# Patient Record
Sex: MALE | Race: WHITE | NOT HISPANIC OR LATINO | URBAN - METROPOLITAN AREA
[De-identification: names, ages, dates, MRNs, and addresses within clinical notes are randomized per-mention and may not be internally consistent; named-entity substitution may affect disease eponyms.]

---

## 2017-05-09 NOTE — PATIENT DISCUSSION
Continue: PreserVision AREDS 2 (vit c,h-ip-reanm-lutein-zeaxan): capsule: 596-457-60-1 mg-unit-mg-mg 1 capsule once a day by mouth

## 2017-05-09 NOTE — PATIENT DISCUSSION
AMD (DRY), OU_:  PRESCRIBE AREDS 2 VITAMINS / AMSLER GRID QD/ UV PROTECTION. SMOKING CESSATION EMPHASIZED. RETURN FOR FOLLOW-UP AS SCHEDULED.

## 2017-11-14 NOTE — PATIENT DISCUSSION
AMD (DRY), ou__:  PRESCRIBE AREDS 2 VITAMINS / AMSLER GRID QD/ UV PROTECTION. SMOKING CESSATION EMPHASIZED. RETURN FOR FOLLOW-UP AS SCHEDULED.

## 2017-11-14 NOTE — PATIENT DISCUSSION
Continue: PreserVision AREDS 2 (vit c,k-cg-akate-lutein-zeaxan): capsule: 670-064-30-4 mg-unit-mg-mg 1 capsule once a day by mouth

## 2018-05-29 NOTE — PATIENT DISCUSSION
Continue: PreserVision AREDS 2 (vit c,b-jz-gjatp-lutein-zeaxan): capsule: 140-243-22-8 mg-unit-mg-mg 1 capsule once a day by mouth

## 2019-04-23 NOTE — PATIENT DISCUSSION
Continue: PreserVision AREDS 2 (vit c,j-qh-jpqsg-lutein-zeaxan): capsule: 633-503-80-6 mg-unit-mg-mg 1 capsule once a day by mouth

## 2019-11-05 NOTE — PATIENT DISCUSSION
New Prescription: neomycin-polymyxin-dexameth (neomycin-polymyxin-dexameth): ointment: 3.5-10,000-0.1 mg-unit/g-% a small amount as directed into both eyes 11-

## 2019-11-05 NOTE — PATIENT DISCUSSION
Continue: PreserVision AREDS 2 (vit c,y-gi-hkqpf-lutein-zeaxan): capsule: 388-320-83-5 mg-unit-mg-mg 1 capsule once a day by mouth

## 2020-05-04 NOTE — PATIENT DISCUSSION
Continue: PreserVision AREDS 2 (vit c,q-ac-rzhwk-lutein-zeaxan): capsule: 474-043-39-2 mg-unit-mg-mg 1 capsule once a day by mouth

## 2020-11-02 NOTE — PATIENT DISCUSSION
Entropion Counseling: The nature of the diagnosis and associated symptoms were discussed. These may include dryness, irritation, tearing, redness. As long as the cornea remains healthy, surgical repair is elective. Alternatives to surgery include aggressive ocular surface lubrication or taping of the lid in an everted position. Oculoplastic consultation is recommended. Return for follow-up as scheduled or sooner if symptoms worsen.

## 2020-11-02 NOTE — PATIENT DISCUSSION
ENTROPION,LLL__:  NOT VISUALLY SIGNIFICANT TO THE PATIENT. RECOMMEND ARTIFICIAL TEARS OR LUBRICATING OINTMENT PRN. REFER TO OCULOPLASTIC SPECIALIST, DR. Brooke Rondon IF PATIEN DESIRES___.

## 2020-11-02 NOTE — PATIENT DISCUSSION
Continue: PreserVision AREDS 2 (vit c,s-ao-ueffm-lutein-zeaxan): capsule: 611-831-54-5 mg-unit-mg-mg 1 capsule once a day by mouth

## 2021-10-12 ENCOUNTER — NEW PATIENT (OUTPATIENT)
Dept: URBAN - METROPOLITAN AREA CLINIC 14 | Facility: CLINIC | Age: 71
End: 2021-10-12

## 2021-10-12 DIAGNOSIS — H04.321: ICD-10-CM

## 2021-10-12 DIAGNOSIS — H04.221: ICD-10-CM

## 2021-10-12 PROCEDURE — 99204 OFFICE O/P NEW MOD 45 MIN: CPT

## 2021-11-08 NOTE — PATIENT DISCUSSION
Age Related Macular Degeneration is a deterioration or breakdown of the eye's macula, the part of the retina that is responsible for central vision. Symptoms include, but are not limited to, hazy vision, difficulty seeing when going from bright light to low light, and a blank or blurry spot in your central vision. I have explained to the patient that successful management is dependent on patient compliance with treatment as prescribed and/or regular follow-up to monitor for possible progression.

## 2022-02-10 ENCOUNTER — POST-OP (OUTPATIENT)
Dept: URBAN - NONMETROPOLITAN AREA CLINIC 5 | Facility: CLINIC | Age: 72
End: 2022-02-10

## 2022-02-10 DIAGNOSIS — H04.321: ICD-10-CM

## 2022-02-10 DIAGNOSIS — Z98.890: ICD-10-CM

## 2022-02-10 DIAGNOSIS — H04.221: ICD-10-CM

## 2022-02-10 PROCEDURE — 99024 POSTOP FOLLOW-UP VISIT: CPT

## 2022-02-10 ASSESSMENT — VISUAL ACUITY
OS_SC: 20/25
OD_SC: 20/25

## 2022-06-07 ENCOUNTER — ESTABLISHED PATIENT (OUTPATIENT)
Dept: URBAN - NONMETROPOLITAN AREA CLINIC 5 | Facility: CLINIC | Age: 72
End: 2022-06-07

## 2022-06-07 DIAGNOSIS — H02.423: ICD-10-CM

## 2022-06-07 DIAGNOSIS — H02.834: ICD-10-CM

## 2022-06-07 DIAGNOSIS — H02.831: ICD-10-CM

## 2022-06-07 PROCEDURE — 92082 INTERMEDIATE VISUAL FIELD XM: CPT

## 2022-06-07 PROCEDURE — 99214 OFFICE O/P EST MOD 30 MIN: CPT

## 2022-06-07 PROCEDURE — 92285 EXTERNAL OCULAR PHOTOGRAPHY: CPT

## 2022-06-08 ASSESSMENT — VISUAL ACUITY
OS_SC: 20/25
OD_SC: 20/25

## 2022-11-08 ENCOUNTER — POST-OP (OUTPATIENT)
Dept: URBAN - METROPOLITAN AREA CLINIC 14 | Facility: CLINIC | Age: 72
End: 2022-11-08

## 2022-11-08 DIAGNOSIS — H02.834: ICD-10-CM

## 2022-11-08 DIAGNOSIS — Z98.890: ICD-10-CM

## 2022-11-08 DIAGNOSIS — H02.423: ICD-10-CM

## 2022-11-08 DIAGNOSIS — H02.831: ICD-10-CM

## 2022-11-08 PROCEDURE — 99024 POSTOP FOLLOW-UP VISIT: CPT

## 2022-11-08 ASSESSMENT — VISUAL ACUITY
OS_SC: 20/25
OD_SC: 20/25

## 2024-12-17 ENCOUNTER — CONSULTATION/EVALUATION (OUTPATIENT)
Age: 74
End: 2024-12-17

## 2024-12-17 DIAGNOSIS — H02.423: ICD-10-CM

## 2024-12-17 PROCEDURE — 92285 EXTERNAL OCULAR PHOTOGRAPHY: CPT

## 2024-12-17 PROCEDURE — 99214 OFFICE O/P EST MOD 30 MIN: CPT

## 2025-06-09 ENCOUNTER — SURGERY/PROCEDURE (OUTPATIENT)
Age: 75
End: 2025-06-09

## 2025-06-09 DIAGNOSIS — H02.423: ICD-10-CM

## 2025-06-09 PROCEDURE — 6790450 RPR BLPOS LEVATOR RESCJ/ADVMNT XTRNL, BILATERAL: Mod: 50

## 2025-06-17 ENCOUNTER — POST-OP (OUTPATIENT)
Age: 75
End: 2025-06-17

## 2025-06-17 DIAGNOSIS — H02.423: ICD-10-CM

## 2025-06-17 DIAGNOSIS — Z98.890: ICD-10-CM

## 2025-06-17 PROCEDURE — 99024 POSTOP FOLLOW-UP VISIT: CPT
